# Patient Record
Sex: MALE | Race: ASIAN | NOT HISPANIC OR LATINO | URBAN - METROPOLITAN AREA
[De-identification: names, ages, dates, MRNs, and addresses within clinical notes are randomized per-mention and may not be internally consistent; named-entity substitution may affect disease eponyms.]

---

## 2023-01-01 ENCOUNTER — HOSPITAL ENCOUNTER (INPATIENT)
Facility: HOSPITAL | Age: 78
LOS: 1 days | End: 2023-05-28
Attending: SURGERY | Admitting: SURGERY

## 2023-01-01 ENCOUNTER — APPOINTMENT (INPATIENT)
Dept: RADIOLOGY | Facility: HOSPITAL | Age: 78
End: 2023-01-01

## 2023-01-01 ENCOUNTER — APPOINTMENT (EMERGENCY)
Dept: RADIOLOGY | Facility: HOSPITAL | Age: 78
End: 2023-01-01

## 2023-01-01 VITALS
HEIGHT: 62 IN | SYSTOLIC BLOOD PRESSURE: 100 MMHG | HEART RATE: 28 BPM | OXYGEN SATURATION: 100 % | RESPIRATION RATE: 16 BRPM | BODY MASS INDEX: 20.65 KG/M2 | DIASTOLIC BLOOD PRESSURE: 65 MMHG | TEMPERATURE: 97.5 F | WEIGHT: 112.21 LBS

## 2023-01-01 DIAGNOSIS — I60.9 SAH (SUBARACHNOID HEMORRHAGE) (HCC): ICD-10-CM

## 2023-01-01 DIAGNOSIS — T14.90XA TRAUMA: Primary | ICD-10-CM

## 2023-01-01 DIAGNOSIS — S06.5XAA SDH (SUBDURAL HEMATOMA) (HCC): ICD-10-CM

## 2023-01-01 LAB
2HR DELTA HS TROPONIN: 1 NG/L
4HR DELTA HS TROPONIN: 5 NG/L
ABO GROUP BLD: NORMAL
ABO GROUP BLD: NORMAL
ALBUMIN SERPL BCP-MCNC: 3.9 G/DL (ref 3.5–5)
ALP SERPL-CCNC: 54 U/L (ref 46–116)
ALT SERPL W P-5'-P-CCNC: 42 U/L (ref 12–78)
AMPHETAMINES SERPL QL SCN: NEGATIVE
ANION GAP SERPL CALCULATED.3IONS-SCNC: 2 MMOL/L (ref 4–13)
ANION GAP SERPL CALCULATED.3IONS-SCNC: 4 MMOL/L (ref 4–13)
APAP SERPL-MCNC: <2 UG/ML (ref 10–20)
APTT PPP: 89 SECONDS (ref 23–37)
AST SERPL W P-5'-P-CCNC: 60 U/L (ref 5–45)
ATRIAL RATE: 73 BPM
ATRIAL RATE: 88 BPM
BARBITURATES UR QL: NEGATIVE
BASE EXCESS BLDA CALC-SCNC: -1.8 MMOL/L
BASE EXCESS BLDA CALC-SCNC: 5 MMOL/L (ref -2–3)
BASOPHILS # BLD AUTO: 0.02 THOUSANDS/ÂΜL (ref 0–0.1)
BASOPHILS NFR BLD AUTO: 0 % (ref 0–1)
BENZODIAZ UR QL: NEGATIVE
BILIRUB SERPL-MCNC: 1.33 MG/DL (ref 0.2–1)
BLD GP AB SCN SERPL QL: NEGATIVE
BUN SERPL-MCNC: 16 MG/DL (ref 5–25)
BUN SERPL-MCNC: 16 MG/DL (ref 5–25)
CA-I BLD-SCNC: 1.23 MMOL/L (ref 1.12–1.32)
CALCIUM SERPL-MCNC: 7.8 MG/DL (ref 8.3–10.1)
CALCIUM SERPL-MCNC: 9.3 MG/DL (ref 8.3–10.1)
CARDIAC TROPONIN I PNL SERPL HS: 12 NG/L
CARDIAC TROPONIN I PNL SERPL HS: 7 NG/L
CARDIAC TROPONIN I PNL SERPL HS: 8 NG/L
CHLORIDE SERPL-SCNC: 105 MMOL/L (ref 96–108)
CHLORIDE SERPL-SCNC: 115 MMOL/L (ref 96–108)
CKLY30: >22 % (ref 0–2.6)
CKR(REACTION TIME): 11.6 MIN (ref 4.6–9.1)
CO2 SERPL-SCNC: 27 MMOL/L (ref 21–32)
CO2 SERPL-SCNC: 28 MMOL/L (ref 21–32)
COCAINE UR QL: NEGATIVE
CREAT SERPL-MCNC: 1.15 MG/DL (ref 0.6–1.3)
CREAT SERPL-MCNC: 1.25 MG/DL (ref 0.6–1.3)
CRTMA(RAPIDTEG MAX AMPLITUDE): <40 MM (ref 52–70)
EOSINOPHIL # BLD AUTO: 0.06 THOUSAND/ÂΜL (ref 0–0.61)
EOSINOPHIL NFR BLD AUTO: 1 % (ref 0–6)
ERYTHROCYTE [DISTWIDTH] IN BLOOD BY AUTOMATED COUNT: 12 % (ref 11.6–15.1)
ETHANOL SERPL-MCNC: <3 MG/DL (ref 0–3)
ETHANOL SERPL-MCNC: <3 MG/DL (ref 0–3)
GFR SERPL CREATININE-BSD FRML MDRD: 55 ML/MIN/1.73SQ M
GFR SERPL CREATININE-BSD FRML MDRD: 61 ML/MIN/1.73SQ M
GLUCOSE SERPL-MCNC: 153 MG/DL (ref 65–140)
GLUCOSE SERPL-MCNC: 157 MG/DL (ref 65–140)
GLUCOSE SERPL-MCNC: 166 MG/DL (ref 65–140)
HCO3 BLDA-SCNC: 22.8 MMOL/L (ref 22–28)
HCO3 BLDA-SCNC: 32.7 MMOL/L (ref 24–30)
HCT VFR BLD AUTO: 40.8 % (ref 36.5–49.3)
HCT VFR BLD CALC: 40 % (ref 36.5–49.3)
HGB BLD-MCNC: 13 G/DL (ref 12–17)
HGB BLDA-MCNC: 13.6 G/DL (ref 12–17)
HKHMA(MAX AMPLITUDE KAOLIN): <42 MM (ref 53–68)
IMM GRANULOCYTES # BLD AUTO: 0.01 THOUSAND/UL (ref 0–0.2)
IMM GRANULOCYTES NFR BLD AUTO: 0 % (ref 0–2)
INR PPP: 2.17 (ref 0.84–1.19)
LYMPHOCYTES # BLD AUTO: 4.08 THOUSANDS/ÂΜL (ref 0.6–4.47)
LYMPHOCYTES NFR BLD AUTO: 50 % (ref 14–44)
MAGNESIUM SERPL-MCNC: 2.1 MG/DL (ref 1.6–2.6)
MCH RBC QN AUTO: 31.9 PG (ref 26.8–34.3)
MCHC RBC AUTO-ENTMCNC: 31.9 G/DL (ref 31.4–37.4)
MCV RBC AUTO: 100 FL (ref 82–98)
METHADONE UR QL: NEGATIVE
MONOCYTES # BLD AUTO: 0.51 THOUSAND/ÂΜL (ref 0.17–1.22)
MONOCYTES NFR BLD AUTO: 6 % (ref 4–12)
NEUTROPHILS # BLD AUTO: 3.52 THOUSANDS/ÂΜL (ref 1.85–7.62)
NEUTS SEG NFR BLD AUTO: 43 % (ref 43–75)
NRBC BLD AUTO-RTO: 0 /100 WBCS
O2 CT BLDA-SCNC: 17.2 ML/DL (ref 16–23)
OPIATES UR QL SCN: NEGATIVE
OSMOLALITY UR/SERPL-RTO: 315 MMOL/KG (ref 282–298)
OXYCODONE+OXYMORPHONE UR QL SCN: NEGATIVE
OXYHGB MFR BLDA: 98.8 % (ref 94–97)
P AXIS: 80 DEGREES
PCO2 BLD: 35 MMOL/L (ref 21–32)
PCO2 BLD: 63.7 MM HG (ref 42–50)
PCO2 BLDA: 38.5 MM HG (ref 36–44)
PCP UR QL: NEGATIVE
PH BLD: 7.32 [PH] (ref 7.3–7.4)
PH BLDA: 7.39 [PH] (ref 7.35–7.45)
PHOSPHATE SERPL-MCNC: 3.7 MG/DL (ref 2.3–4.1)
PLATELET # BLD AUTO: 145 THOUSANDS/UL (ref 149–390)
PMV BLD AUTO: 9.1 FL (ref 8.9–12.7)
PO2 BLD: 24 MM HG (ref 35–45)
PO2 BLDA: 442.7 MM HG (ref 75–129)
POTASSIUM BLD-SCNC: 3.3 MMOL/L (ref 3.5–5.3)
POTASSIUM SERPL-SCNC: 3.2 MMOL/L (ref 3.5–5.3)
POTASSIUM SERPL-SCNC: 4.4 MMOL/L (ref 3.5–5.3)
PR INTERVAL: 0 MS
PR INTERVAL: 176 MS
PROT SERPL-MCNC: 7 G/DL (ref 6.4–8.4)
PROTHROMBIN TIME: 24.4 SECONDS (ref 11.6–14.5)
QRS AXIS: -3 DEGREES
QRS AXIS: 48 DEGREES
QRSD INTERVAL: 125 MS
QRSD INTERVAL: 138 MS
QT INTERVAL: 383 MS
QT INTERVAL: 414 MS
QTC INTERVAL: 445 MS
QTC INTERVAL: 500 MS
RBC # BLD AUTO: 4.07 MILLION/UL (ref 3.88–5.62)
RH BLD: POSITIVE
RH BLD: POSITIVE
SALICYLATES SERPL-MCNC: <3 MG/DL (ref 3–20)
SAO2 % BLD FROM PO2: 37 % (ref 60–85)
SODIUM BLD-SCNC: 141 MMOL/L (ref 136–145)
SODIUM SERPL-SCNC: 137 MMOL/L (ref 135–147)
SODIUM SERPL-SCNC: 144 MMOL/L (ref 135–147)
SPECIMEN EXPIRATION DATE: NORMAL
SPECIMEN SOURCE: ABNORMAL
SPECIMEN SOURCE: ABNORMAL
T WAVE AXIS: 56 DEGREES
T WAVE AXIS: 56 DEGREES
THC UR QL: NEGATIVE
VENTRICULAR RATE: 81 BPM
VENTRICULAR RATE: 88 BPM
WBC # BLD AUTO: 8.2 THOUSAND/UL (ref 4.31–10.16)

## 2023-01-01 PROCEDURE — 0BH18EZ INSERTION OF ENDOTRACHEAL AIRWAY INTO TRACHEA, VIA NATURAL OR ARTIFICIAL OPENING ENDOSCOPIC: ICD-10-PCS

## 2023-01-01 PROCEDURE — 5A12012 PERFORMANCE OF CARDIAC OUTPUT, SINGLE, MANUAL: ICD-10-PCS | Performed by: EMERGENCY MEDICINE

## 2023-01-01 PROCEDURE — 5A1935Z RESPIRATORY VENTILATION, LESS THAN 24 CONSECUTIVE HOURS: ICD-10-PCS

## 2023-01-01 RX ORDER — ETOMIDATE 2 MG/ML
INJECTION INTRAVENOUS CODE/TRAUMA/SEDATION MEDICATION
Status: COMPLETED | OUTPATIENT
Start: 2023-01-01 | End: 2023-01-01

## 2023-01-01 RX ORDER — FENTANYL CITRATE-0.9 % NACL/PF 10 MCG/ML
50 PLASTIC BAG, INJECTION (ML) INTRAVENOUS CONTINUOUS
Status: DISCONTINUED | OUTPATIENT
Start: 2023-01-01 | End: 2023-01-01

## 2023-01-01 RX ORDER — 3% SODIUM CHLORIDE 3 G/100ML
30 INJECTION, SOLUTION INTRAVENOUS CONTINUOUS
Status: DISCONTINUED | OUTPATIENT
Start: 2023-01-01 | End: 2023-01-01

## 2023-01-01 RX ORDER — CHLORHEXIDINE GLUCONATE 0.12 MG/ML
15 RINSE ORAL EVERY 12 HOURS SCHEDULED
Status: DISCONTINUED | OUTPATIENT
Start: 2023-01-01 | End: 2023-01-01

## 2023-01-01 RX ORDER — OXYMETAZOLINE HYDROCHLORIDE 0.05 G/100ML
2 SPRAY NASAL EVERY 12 HOURS SCHEDULED
Status: DISCONTINUED | OUTPATIENT
Start: 2023-01-01 | End: 2023-01-01 | Stop reason: HOSPADM

## 2023-01-01 RX ORDER — ONDANSETRON 2 MG/ML
4 INJECTION INTRAMUSCULAR; INTRAVENOUS ONCE
Status: COMPLETED | OUTPATIENT
Start: 2023-01-01 | End: 2023-01-01

## 2023-01-01 RX ORDER — LABETALOL HYDROCHLORIDE 5 MG/ML
10 INJECTION, SOLUTION INTRAVENOUS EVERY 4 HOURS PRN
Status: DISCONTINUED | OUTPATIENT
Start: 2023-01-01 | End: 2023-01-01 | Stop reason: HOSPADM

## 2023-01-01 RX ORDER — HYDROMORPHONE HCL IN WATER/PF 6 MG/30 ML
0.2 PATIENT CONTROLLED ANALGESIA SYRINGE INTRAVENOUS EVERY 4 HOURS PRN
Status: DISCONTINUED | OUTPATIENT
Start: 2023-01-01 | End: 2023-01-01

## 2023-01-01 RX ORDER — SUCCINYLCHOLINE/SOD CL,ISO/PF 100 MG/5ML
SYRINGE (ML) INTRAVENOUS CODE/TRAUMA/SEDATION MEDICATION
Status: COMPLETED | OUTPATIENT
Start: 2023-01-01 | End: 2023-01-01

## 2023-01-01 RX ORDER — PROPOFOL 10 MG/ML
INJECTION, EMULSION INTRAVENOUS CODE/TRAUMA/SEDATION MEDICATION
Status: COMPLETED | OUTPATIENT
Start: 2023-01-01 | End: 2023-01-01

## 2023-01-01 RX ORDER — CHLORHEXIDINE GLUCONATE 0.12 MG/ML
15 RINSE ORAL EVERY 12 HOURS SCHEDULED
Status: DISCONTINUED | OUTPATIENT
Start: 2023-01-01 | End: 2023-01-01 | Stop reason: HOSPADM

## 2023-01-01 RX ORDER — SODIUM CHLORIDE 3 G/100ML
250 INJECTION, SOLUTION INTRAVENOUS ONCE
Status: COMPLETED | OUTPATIENT
Start: 2023-01-01 | End: 2023-01-01

## 2023-01-01 RX ORDER — HYDROMORPHONE HCL/PF 1 MG/ML
0.5 SYRINGE (ML) INJECTION ONCE
Status: COMPLETED | OUTPATIENT
Start: 2023-01-01 | End: 2023-01-01

## 2023-01-01 RX ORDER — SODIUM CHLORIDE 9 MG/ML
100 INJECTION, SOLUTION INTRAVENOUS CONTINUOUS
Status: DISCONTINUED | OUTPATIENT
Start: 2023-01-01 | End: 2023-01-01 | Stop reason: HOSPADM

## 2023-01-01 RX ORDER — PROPOFOL 10 MG/ML
5-50 INJECTION, EMULSION INTRAVENOUS
Status: DISCONTINUED | OUTPATIENT
Start: 2023-01-01 | End: 2023-01-01

## 2023-01-01 RX ORDER — LABETALOL HYDROCHLORIDE 5 MG/ML
20 INJECTION, SOLUTION INTRAVENOUS ONCE
Status: COMPLETED | OUTPATIENT
Start: 2023-01-01 | End: 2023-01-01

## 2023-01-01 RX ORDER — HYDROMORPHONE HCL/PF 1 MG/ML
0.5 SYRINGE (ML) INJECTION EVERY 4 HOURS PRN
Status: DISCONTINUED | OUTPATIENT
Start: 2023-01-01 | End: 2023-01-01

## 2023-01-01 RX ORDER — FENTANYL CITRATE 50 UG/ML
50 INJECTION, SOLUTION INTRAMUSCULAR; INTRAVENOUS ONCE
Status: COMPLETED | OUTPATIENT
Start: 2023-01-01 | End: 2023-01-01

## 2023-01-01 RX ORDER — FENTANYL CITRATE 50 UG/ML
INJECTION, SOLUTION INTRAMUSCULAR; INTRAVENOUS
Status: COMPLETED
Start: 2023-01-01 | End: 2023-01-01

## 2023-01-01 RX ORDER — ONDANSETRON 2 MG/ML
4 INJECTION INTRAMUSCULAR; INTRAVENOUS EVERY 6 HOURS PRN
Status: DISCONTINUED | OUTPATIENT
Start: 2023-01-01 | End: 2023-01-01 | Stop reason: HOSPADM

## 2023-01-01 RX ADMIN — TETANUS TOXOID, REDUCED DIPHTHERIA TOXOID AND ACELLULAR PERTUSSIS VACCINE, ADSORBED 0.5 ML: 5; 2.5; 8; 8; 2.5 SUSPENSION INTRAMUSCULAR at 01:50

## 2023-01-01 RX ADMIN — SODIUM CHLORIDE 100 ML/HR: 0.9 INJECTION, SOLUTION INTRAVENOUS at 04:41

## 2023-01-01 RX ADMIN — LABETALOL HYDROCHLORIDE 20 MG: 5 INJECTION, SOLUTION INTRAVENOUS at 01:47

## 2023-01-01 RX ADMIN — CHLORHEXIDINE GLUCONATE 0.12% ORAL RINSE 15 ML: 1.2 LIQUID ORAL at 08:05

## 2023-01-01 RX ADMIN — SODIUM CHLORIDE 100 ML/HR: 0.9 INJECTION, SOLUTION INTRAVENOUS at 01:52

## 2023-01-01 RX ADMIN — Medication 2000 UNITS: at 03:07

## 2023-01-01 RX ADMIN — PHYTONADIONE 10 MG: 10 INJECTION, EMULSION INTRAMUSCULAR; INTRAVENOUS; SUBCUTANEOUS at 03:27

## 2023-01-01 RX ADMIN — Medication 80 MG: at 02:15

## 2023-01-01 RX ADMIN — IOHEXOL 85 ML: 350 INJECTION, SOLUTION INTRAVENOUS at 02:56

## 2023-01-01 RX ADMIN — PROPOFOL 30 MCG/KG/MIN: 10 INJECTION, EMULSION INTRAVENOUS at 03:19

## 2023-01-01 RX ADMIN — LEVETIRACETAM 1500 MG: 100 INJECTION, SOLUTION INTRAVENOUS at 04:11

## 2023-01-01 RX ADMIN — SODIUM CHLORIDE 250 ML: 3 INJECTION, SOLUTION INTRAVENOUS at 03:20

## 2023-01-01 RX ADMIN — ETOMIDATE 10 MG: 20 INJECTION, SOLUTION INTRAVENOUS at 02:15

## 2023-01-01 RX ADMIN — FENTANYL CITRATE 50 MCG: 50 INJECTION INTRAMUSCULAR; INTRAVENOUS at 10:15

## 2023-01-01 RX ADMIN — FENTANYL CITRATE 50 MCG: 50 INJECTION, SOLUTION INTRAMUSCULAR; INTRAVENOUS at 10:15

## 2023-01-01 RX ADMIN — DESMOPRESSIN ACETATE 23.2 MCG: 4 SOLUTION INTRAVENOUS at 03:22

## 2023-01-01 RX ADMIN — ONDANSETRON 4 MG: 2 INJECTION INTRAMUSCULAR; INTRAVENOUS at 01:15

## 2023-01-01 RX ADMIN — Medication 50 MCG/HR: at 03:16

## 2023-01-01 RX ADMIN — HYDROMORPHONE HYDROCHLORIDE 0.5 MG: 1 INJECTION, SOLUTION INTRAMUSCULAR; INTRAVENOUS; SUBCUTANEOUS at 03:15

## 2023-01-01 RX ADMIN — HYDROMORPHONE HYDROCHLORIDE 0.5 MG: 1 INJECTION, SOLUTION INTRAMUSCULAR; INTRAVENOUS; SUBCUTANEOUS at 02:33

## 2023-01-01 RX ADMIN — PROPOFOL 20 MG: 10 INJECTION, EMULSION INTRAVENOUS at 02:20

## 2023-01-01 RX ADMIN — CHLORHEXIDINE GLUCONATE 0.12% ORAL RINSE 15 ML: 1.2 LIQUID ORAL at 03:20

## 2023-01-01 RX ADMIN — SODIUM CHLORIDE 50 ML/HR: 3 INJECTION, SOLUTION INTRAVENOUS at 03:52

## 2023-05-28 PROBLEM — I62.00 SUBDURAL HEMORRHAGE (HCC): Status: ACTIVE | Noted: 2023-01-01
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
## 2023-05-28 PROBLEM — J96.00 ACUTE RESPIRATORY FAILURE (HCC): Status: ACTIVE | Noted: 2023-01-01

## 2023-05-28 NOTE — PLAN OF CARE
Problem: MOBILITY - ADULT  Goal: Maintain or return to baseline ADL function  Description: INTERVENTIONS:  -  Assess patient's ability to carry out ADLs; assess patient's baseline for ADL function and identify physical deficits which impact ability to perform ADLs (bathing, care of mouth/teeth, toileting, grooming, dressing, etc )  - Assess/evaluate cause of self-care deficits   - Assess range of motion  - Assess patient's mobility; develop plan if impaired  - Assess patient's need for assistive devices and provide as appropriate  - Encourage maximum independence but intervene and supervise when necessary  - Involve family in performance of ADLs  - Assess for home care needs following discharge   - Consider OT consult to assist with ADL evaluation and planning for discharge  - Provide patient education as appropriate  Outcome: Not Progressing  Goal: Maintains/Returns to pre admission functional level  Description: INTERVENTIONS:  - Perform BMAT or MOVE assessment daily    - Set and communicate daily mobility goal to care team and patient/family/caregiver  - Collaborate with rehabilitation services on mobility goals if consulted  - Perform Range of Motion 3 times a day  - Reposition patient every 2 hours    - Dangle patient 3 times a day  - Stand patient 3 times a day  - Ambulate patient 3 times a day  - Out of bed to chair 3 times a day   - Out of bed for meals 3 times a day  - Out of bed for toileting  - Record patient progress and toleration of activity level   Outcome: Not Progressing     Problem: Prexisting or High Potential for Compromised Skin Integrity  Goal: Skin integrity is maintained or improved  Description: INTERVENTIONS:  - Identify patients at risk for skin breakdown  - Assess and monitor skin integrity  - Assess and monitor nutrition and hydration status  - Monitor labs   - Assess for incontinence   - Turn and reposition patient  - Assist with mobility/ambulation  - Relieve pressure over bony prominences  - Avoid friction and shearing  - Provide appropriate hygiene as needed including keeping skin clean and dry  - Evaluate need for skin moisturizer/barrier cream  - Collaborate with interdisciplinary team   - Patient/family teaching  - Consider wound care consult   Outcome: Not Progressing     Problem: PAIN - ADULT  Goal: Verbalizes/displays adequate comfort level or baseline comfort level  Description: Interventions:  - Encourage patient to monitor pain and request assistance  - Assess pain using appropriate pain scale  - Administer analgesics based on type and severity of pain and evaluate response  - Implement non-pharmacological measures as appropriate and evaluate response  - Consider cultural and social influences on pain and pain management  - Notify physician/advanced practitioner if interventions unsuccessful or patient reports new pain  Outcome: Not Progressing     Problem: INFECTION - ADULT  Goal: Absence or prevention of progression during hospitalization  Description: INTERVENTIONS:  - Assess and monitor for signs and symptoms of infection  - Monitor lab/diagnostic results  - Monitor all insertion sites, i e  indwelling lines, tubes, and drains  - Monitor endotracheal if appropriate and nasal secretions for changes in amount and color  - [place] appropriate cooling/warming therapies per order  - Administer medications as ordered  - Instruct and encourage patient and family to use good hand hygiene technique  - Identify and instruct in appropriate isolation precautions for identified infection/condition  Outcome: Not Progressing     Problem: SAFETY ADULT  Goal: Maintain or return to baseline ADL function  Description: INTERVENTIONS:  -  Assess patient's ability to carry out ADLs; assess patient's baseline for ADL function and identify physical deficits which impact ability to perform ADLs (bathing, care of mouth/teeth, toileting, grooming, dressing, etc )  - Assess/evaluate cause of self-care deficits   - Assess range of motion  - Assess patient's mobility; develop plan if impaired  - Assess patient's need for assistive devices and provide as appropriate  - Encourage maximum independence but intervene and supervise when necessary  - Involve family in performance of ADLs  - Assess for home care needs following discharge   - Consider OT consult to assist with ADL evaluation and planning for discharge  - Provide patient education as appropriate  Outcome: Not Progressing  Goal: Maintains/Returns to pre admission functional level  Description: INTERVENTIONS:  - Perform BMAT or MOVE assessment daily    - Set and communicate daily mobility goal to care team and patient/family/caregiver  - Collaborate with rehabilitation services on mobility goals if consulted  - Perform Range of Motion 3 times a day  - Reposition patient every 2 hours    - Dangle patient 3 times a day  - Stand patient 3 times a day  - Ambulate patient 3 times a day  - Out of bed to chair 3 times a day   - Out of bed for meals 3 times a day  - Out of bed for toileting  - Record patient progress and toleration of activity level   Outcome: Not Progressing  Goal: Patient will remain free of falls  Description: INTERVENTIONS:  - Educate patient/family on patient safety including physical limitations  - Instruct patient to call for assistance with activity   - Consult OT/PT to assist with strengthening/mobility   - Keep Call bell within reach  - Keep bed low and locked with side rails adjusted as appropriate  - Keep care items and personal belongings within reach  - Initiate and maintain comfort rounds  - Make Fall Risk Sign visible to staff  - Offer Toileting every 2 Hours, in advance of need  - Initiate/Maintain bed alarm  - Obtain necessary fall risk management equipment: n/a  - Apply yellow socks and bracelet for high fall risk patients  - Consider moving patient to room near nurses station  Outcome: Not Progressing     Problem: DISCHARGE PLANNING  Goal: Discharge to home or other facility with appropriate resources  Description: INTERVENTIONS:  - Identify barriers to discharge w/patient and caregiver  - Arrange for needed discharge resources and transportation as appropriate  - Identify discharge learning needs (meds, wound care, etc )  - Arrange for interpretive services to assist at discharge as needed  - Refer to Case Management Department for coordinating discharge planning if the patient needs post-hospital services based on physician/advanced practitioner order or complex needs related to functional status, cognitive ability, or social support system  Outcome: Not Progressing     Problem: Knowledge Deficit  Goal: Patient/family/caregiver demonstrates understanding of disease process, treatment plan, medications, and discharge instructions  Description: Complete learning assessment and assess knowledge base  Interventions:  - Provide teaching at level of understanding  - Provide teaching via preferred learning methods  Outcome: Not Progressing     Problem: Neurological Deficit  Goal: Neurological status is stable or improving  Description: Interventions:  - Monitor and assess patient's level of consciousness, motor function, sensory function, and level of assistance needed for ADLs  - Monitor and report changes from baseline  Collaborate with interdisciplinary team to initiate plan and implement interventions as ordered  - Provide and maintain a safe environment  - Consider seizure precautions  - Consider fall precautions  - Consider aspiration precautions  - Consider bleeding precautions  Outcome: Not Progressing     Problem: Activity Intolerance/Impaired Mobility  Goal: Mobility/activity is maintained at optimum level for patient  Description: Interventions:  - Assess and monitor patient  barriers to mobility and need for assistive/adaptive devices  - Assess patient's emotional response to limitations    - Collaborate with interdisciplinary team and initiate plans and interventions as ordered  - Encourage independent activity per ability   - Maintain proper body alignment  - Perform active/passive rom as tolerated/ordered  - Plan activities to conserve energy   - Turn patient as appropriate  Outcome: Not Progressing     Problem: Communication Impairment  Goal: Ability to express needs and understand communication  Description: Assess patient's communication skills and ability to understand information  Patient will demonstrate use of effective communication techniques, alternative methods of communication and understanding even if not able to speak  - Encourage communication and provide alternate methods of communication as needed  - Collaborate with case management/ for discharge needs  - Include patient/family/caregiver in decisions related to communication  Outcome: Not Progressing     Problem: Potential for Aspiration  Goal: Ventilated patient's risk of aspiration is minimized  Description: Assess and monitor vital signs, respiratory status, airway cuff pressure, and labs (WBC)  Monitor for signs of aspiration (tachypnea, cough, rales, wheezing, cyanosis, fever)  - Elevate head of bed 30 degrees if patient has tube feeding   - Monitor tube feeding  Outcome: Not Progressing     Problem: Nutrition  Goal: Nutrition/Hydration status is improving  Description: Monitor and assess patient's nutrition/hydration status for malnutrition (ex- brittle hair, bruises, dry skin, pale skin and conjunctiva, muscle wasting, smooth red tongue, and disorientation)  Collaborate with interdisciplinary team and initiate plan and interventions as ordered  Monitor patient's weight and dietary intake as ordered or per policy  Utilize nutrition screening tool and intervene per policy  Determine patient's food preferences and provide high-protein, high-caloric foods as appropriate       - Assist patient with eating   - Allow adequate time for meals   - Encourage patient to take dietary supplement as ordered  - Collaborate with clinical nutritionist   - Include patient/family/caregiver in decisions related to nutrition  Outcome: Not Progressing     Problem: NEUROSENSORY - ADULT  Goal: Achieves stable or improved neurological status  Description: INTERVENTIONS  - Monitor and report changes in neurological status  - Monitor vital signs such as temperature, blood pressure, glucose, and any other labs ordered   - Initiate measures to prevent increased intracranial pressure  - Monitor for seizure activity and implement precautions if appropriate      Outcome: Not Progressing  Goal: Remains free of injury related to seizures activity  Description: INTERVENTIONS  - Maintain airway, patient safety  and administer oxygen as ordered  - Monitor patient for seizure activity, document and report duration and description of seizure to physician/advanced practitioner  - If seizure occurs,  ensure patient safety during seizure  - Reorient patient post seizure  - Seizure pads on all 4 side rails  - Instruct patient/family to notify RN of any seizure activity including if an aura is experienced  - Instruct patient/family to call for assistance with activity based on nursing assessment  - Administer anti-seizure medications if ordered    Outcome: Not Progressing  Goal: Achieves maximal functionality and self care  Description: INTERVENTIONS  - Monitor swallowing and airway patency with patient fatigue and changes in neurological status  - Encourage and assist patient to increase activity and self care     - Encourage visually impaired, hearing impaired and aphasic patients to use assistive/communication devices  Outcome: Not Progressing

## 2023-05-28 NOTE — ED PROVIDER NOTES
Emergency Department Airway Evaluation and Management Form    History  Obtained from: EMS  Patient has no allergy information on record  No chief complaint on file  a76-year-old male status post [place] with [place] patient with GCS less than 13 per EMS  Language barrier present patient uncooperative  Level a trauma called prior to arrival          No past medical history on file  No past surgical history on file  No family history on file  I have reviewed and agree with the history as documented      Review of Systems    Physical Exam  BP (!) 161/119   Pulse 90   Resp 20   SpO2 95%     Physical Exam  HENT:      Mouth/Throat:      Comments: Airway open pain        ED Medications  Medications   ondansetron (ZOFRAN) injection 4 mg (4 mg Intravenous Given 5/28/23 0115)       Intubation  Procedures    Notes  No acute airway intervention indicated    Final Diagnosis  Final diagnoses:   None       ED Provider  Electronically Signed by     Mike Izquierdo MD  05/28/23 7673

## 2023-05-28 NOTE — CODE DOCUMENTATION
0957 pea, pulse check  Pt bagged by respiratory therapist  CPR resumed  1000 vtach with a pulse, synchronized cardioversion 200 joules  Bp 186/102  1001 hr 136, sinus tach with wide complex, intermittent pulse palpitated, bp 159/138  1002 hr 107, intermittent pulse,  1003 CPR resumes, 1 amp of epi  1005 pulse found hr 107 wide complex  Ines 2 100%, bp 143/110  1006 svt 158 with pulse  1007 hr 137 with pulse /77  1010 cardiac echo at bedside by Dr Vel Lorenzo  1011 hr 89 bp 100/65  1011 placed back on vent setting  1018 Pt pronounced by Dr Vel Lorenzo   Pt  Asystole on monitor

## 2023-05-28 NOTE — PLAN OF CARE
Problem: PAIN - ADULT  Goal: Verbalizes/displays adequate comfort level or baseline comfort level  Description: Interventions:  - Encourage patient to monitor pain and request assistance  - Assess pain using appropriate pain scale  - Administer analgesics based on type and severity of pain and evaluate response  - Implement non-pharmacological measures as appropriate and evaluate response  - Consider cultural and social influences on pain and pain management  - Notify physician/advanced practitioner if interventions unsuccessful or patient reports new pain  Outcome: Progressing     Problem: INFECTION - ADULT  Goal: Absence or prevention of progression during hospitalization  Description: INTERVENTIONS:  - Assess and monitor for signs and symptoms of infection  - Monitor lab/diagnostic results  - Monitor all insertion sites, i e  indwelling lines, tubes, and drains  - Monitor endotracheal if appropriate and nasal secretions for changes in amount and color  - [place] appropriate cooling/warming therapies per order  - Administer medications as ordered  - Instruct and encourage patient and family to use good hand hygiene technique  - Identify and instruct in appropriate isolation precautions for identified infection/condition  Outcome: Progressing
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
## 2023-05-28 NOTE — RESPIRATORY THERAPY NOTE
RT Ventilator Management Note  Ngoc Gardiner 78 y o  male MRN: [id number]  Unit/Bed#: ICU 12 Encounter: [id number]      Daily Screen    No data found in the last 10 encounters  Physical Exam:   Assessment Type: Assess only  General Appearance: Sedated  Respiratory Pattern: Assisted  Chest Assessment: Chest expansion symmetrical  Bilateral Breath Sounds: Clear  O2 Device: Pt assess for respiratory protocol at this time with no history of pulmonary disease nor takes any respiratory treatment at home  Pt admitted for fall with subdural hemorrhage, acute respiratory failure  BS clear, VS WNL at this time  Will continue to monitor per respiratory procol  Resp Comments: (P) Pt assess for respiratory protocol at this time with no history of pulmonary disease nor takes any bromchodilators at home  Pt admitted for fall with subdural hemorrhage, acute respiratory failure  Will continue to monitor per respiratory protocol
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
## 2023-05-28 NOTE — DISCHARGE SUMMARY
Discharge Summary - Valery Colvin 78 y o  male MRN: [id number]    Unit/Bed#: ICU 12 Encounter: [id number] PCP: Tyler Villa    Admission Date:   Admission Orders (From admission, onward)     Ordered        05/28/23 0141  Inpatient Admission  Once                        Admitting Diagnosis: Trauma [T14 90XA]  SAH (subarachnoid hemorrhage) ([facility]) [I60 9]  SDH (subdural hematoma) ([facility] [address] ) [S06  5XAA]  Unspecified multiple injuries, initial encounter [T07  XXXA]    HPI: Patient is a 29-year-old male presenting as a level a trauma following a fall  He has an unknown past medical history  In the trauma bay it was unclear if the patient did not speak due to language barrier versus significant traumatic brain injury  The patient spontaneously open his eyes and was purposeful with movement but did not speak  His TEG analysis was grossly abnormal and received DDAVP and Kcentra  His initial neurologic imaging was notable for bilateral subdural hematomas and scattered subarachnoid hemorrhages  He was referred to the critical care unit for admission  Procedures Performed:   Orders Placed This Encounter   Procedures   • Intubation       Summary of Hospital Course: Shortly after admission the patient had significant decompensation both hemodynamically and neurologically  Repeat neurologic imaging demonstrated interval development increase in the bilateral subacute arachnoid hemorrhages and hemorrhagic contusions into the midbrain and brainstem  The patient was intubated and started on hypertonic saline and neurosurgery was contacted who felt that the recent CT demonstrated likely nonsurvivable traumatic brain injury  There were ongoing efforts to locate family or friends of the patient without effect  The patient continued to have hemodynamic changes including profound hypotension, loss of pupillary reflexes, loss of cough/gag raising the concern for brain death    Prior to an official brain death examination the patient's rhythm disintegrated into ventricular fibrillation at which point cardiopulmonary resuscitation was initiated  Despite 3 rounds of cardiopulmonary resuscitation he only had transient return to spontaneous circulation  Given his highly morbid intracranial imaging and profound hemodynamic instability the decision was made to terminate further cardiopulmonary resuscitative efforts after discussion with attendings for other services  The patient was pronounced   Significant Findings, Care, Treatment and Services Provided:    Cardiac arrest   CTA Head/Neck-interval development of significant increase in bilateral subarachnoid hemorrhage and hemorrhagic contusion including the midbrain and brainstem, bilateral subdural hematomas with right to left midline shift of 5 mm   Intubated   CT Head-bilateral subdural hematomas and scattered subarachnoid hemorrhage   CT Cervical spine-no cervical spine fracture or traumatic malalignment   Chest xray-increased density within the right lower lobe    Complications: None    Disposition:      Final Diagnosis:   1  Cardiac arrest  2  Intracranial hemorrhages  3  Suspected intracranial hypertension  4  Severe traumatic brain injury  5  Acute encephalopathy  6  Acute respiratory failure  7  Coagulopathy  8   Thrombocytopenia    Medical Problems        Condition at Time of Death: Resuscitative efforts ceased with no palpable pulse    Date, Time and Cause of Death    Date of Death: 23  Time of Death: 10:18 AM  Preliminary Cause of Death: Intracranial hemorrhage ([facility] [address] )  Entered by: Viridiana URBAN[PG1 1]     Attribution     PG1 1 [address] 23 10:38          Death Note:    INPATIENT DEATH NOTE  Vinod Fajardo 78 y o  male MRN: [id number]  Unit/Bed#: ICU 12 Encounter: [id number]    Date, Time and Cause of Death    Date of Death: 23  Time of Death: 10:18 AM  Preliminary Cause of Death: Intracranial hemorrhage ([facility] )  Entered by: Bertha URBAN[PG1 1]     Attribution     PG1 1 Bertha Cavazos, [address] 23 10:38           Patient's Information  Date of Death: 23  Time of Death: 80  Pronounced by: Dr Leda Garcia  Did the patient's death occur in the ED?: No  Did the patient's death occur in the OR?: No  Did the patient's death occur less than 10 days post-op?: No  Did the patient's death occur within 24 hours of admission?: Yes  Was code status DNR at the time of death?: Yes    PHYSICAL EXAM:  Unresponsive to noxious stimuli, Spontaneous respirations absent, Carotid pulse absent, Pupillary light reflex absent and Corneal blink reflex absent    Medical Examiner notification criteria:  Patient  within 24 hours of arrival to hospital   Medical Examiner's office notified?:  Yes   Medical Examiner accepted case?: Pending  Name of Medical Examiner: Pending    Family Notification  Was the family notified?: No (Comment) (Unable to locate family)    Autopsy Options:  Awaiting call back from medical examiner    Primary Service Attending Physician notified?:  yes - Attending:  Farhad Simmons, DO    Physician/Resident responsible for completing Discharge Summary:  RAJNI Joy

## 2023-05-28 NOTE — CONSULTS
Consultation - [address] 78 y o  male MRN: [id number]  Unit/Bed#: ICU 12 Encounter: [id number]      Assessment/Plan   Patient Active Problem List   Diagnosis   • Subdural hemorrhage ([address] )   • Subarachnoid hemorrhage ([address] )   • Acute respiratory failure (HCC)   • Abrasion of scalp   • Fall     Active issues specifically addressed today include:   - tSDH/tSAH with clinical concern for life threatening herniation   - acute hypoxemic respiratory failure   - cardiac arrest   - goals of support    Plan:  Patient intubated and Full Code given no family member contact with no contact information in the chart  Per chart review, multiple attempts to utilize patient's cell phone to identify contacts  No information available per EMS given injuries [place]  Initial CTH with bilateral tSDH with tSAH with no MLS [@ 8232]  Repeat CTA H/N with significant increase in bilateral tSDH and tSAH with new RTL midline shift [@ 7269]  p/w initially GCS 9 with rapid deterioration to GCS 3  Admitted to ICU for continued medical management      Exam this AM with fixed/dilated pupils c/w loss of brainstem reflexes  Consideration for brain death exam, however, hopes to identify family member or friend prior to exam  Attempts to identify contacts were unsuccessful, cell phone with no identified contacts to notify      This AM, during case discussion with Dr Ankita Farley in the ICU regarding patient's suspected non-survivable TBI with clinical and radiographic e/o life threatening brain herniation, concern for impending cardiac arrest on monitor  Team to bedside  Pulseless VF @ 6095  CPR initiated  Epi administered x 2  Synchronized cardioversion for wide complex pulseless rhythm  ROSC intermittently achieved  Bedside TTE with initial LV/RV function, rapidly deteriorated to poor functionality   Extensive chest wall damage occurred during high quality chest compressions       Given significance and non-survivable injury given TBI with herniation, Dr Jose Maki and myself concur that continued resuscitative supports are harmful and no longer provided beneficial care given extent of brain injury  Upon discussion with the entire team in the room, everyone, including Dr Jose Maki, myself, [facility] fellow, [facility] AP, nursing staff in agreement with plan to transition to Level 2      [facility] Attending at bedside with nursing staff  Given extent of chest wall injury, order for IV fentanyl 50 mcg to address pain/comfort [patient was tolerating fentanyl gtt of 50 mcg/hr overnight]  All in agreement that medication to address patient's comfort and would be tolerated to not hasten death  Prior to medication being administered, rhythm on the monitor actively deteriorating from wide complex tachycardia to bradycardia to asystolic rhythm      Patient pronounced at 1018  We appreciate the opportunity to participate in this patient's care  We will continue to follow  Please do not hesitate to contact our on-call provider through our clinic answering service at [phone] should you have acute symptom control concerns  Controlled Substance Review    [place]MP or [place]  reviewed: No red flags were identified; safe to proceed with prescription  [email or web address] PDMP Review     None          History of Present Illness   Physician Requesting Consult: No att  providers found  Reason for Consult / Principal Problem: support  Hx and PE limited by: critical illness, intubated  HPI: Janyth Kocher is a 78y o  year old male who p/w concern for TBI after fall  Initial w/u concerning for tSDH/tSAH with progression to suspected herniation  No family at bedside, team attempts to identify family/friends was unsuccessful  [facility] consulted for support  See above MDM section for expanded history and recommendations        Inpatient consult to Palliative Care  Consult performed by: Federico Hallman MD  Consult ordered by: Lisa Piper Fermin Garrido MD          Review of Systems   Unable to perform ROS: Acuity of condition       Historical Information   No past medical history on file  No past surgical history on file  No existing history information found  No existing history information found  Social History     Socioeconomic History   • Marital status: Not on file     Spouse name: Not on file   • Number of children: Not on file   • Years of education: Not on file   • Highest education level: Not on file   Occupational History   • Not on file   Tobacco Use   • Smoking status: Not on file   • Smokeless tobacco: Not on file   Substance and Sexual Activity   • Alcohol use: Not on file   • Drug use: Not on file   • Sexual activity: Not on file   Other Topics Concern   • Not on file   Social History Narrative   • Not on file     Social Determinants of Health     Financial Resource Strain: Not on file   Food Insecurity: Not on file   Transportation Needs: Not on file   Physical Activity: Not on file   Stress: Not on file   Social Connections: Not on file   Intimate Partner Violence: Not on file   Housing Stability: Not on file     No family history on file      Meds/Allergies   current meds:   Current Facility-Administered Medications   Medication Dose Route Frequency   • chlorhexidine (PERIDEX) 0 12 % oral rinse 15 mL  15 mL Mouth/Throat Q12H [address]   • labetalol (NORMODYNE) injection 10 mg  10 mg Intravenous Q4H PRN   • levETIRAcetam (KEPPRA) 1,000 mg in sodium chloride 0 9 % 100 mL IVPB  1,000 mg Intravenous Q12H [address]   • ondansetron (ZOFRAN) injection 4 mg  4 mg Intravenous Q6H PRN   • oxymetazoline (AFRIN) 0 05 % nasal spray 2 spray  2 spray Each Nare Q12H [address]   • sodium chloride 0 9 % infusion  100 mL/hr Intravenous Continuous         Not on File    Objective     Physical Exam  Constitutional:       Comments: Critically ill appearing  Intubated   HENT:      Right Ear: External ear normal       Left Ear: External ear normal    Eyes:      Comments: L eye fixed, dilated pupil  R periorbital edema   Pulmonary:      Comments: intubated  Genitourinary:     Comments: Hollis in place  Psychiatric:      Comments: Unable to assess         Lab Results:   I have personally reviewed pertinent labs  , CBC:   Lab Results   Component Value Date    HCT 40 8 05/28/2023    HCT 40 05/28/2023    HGB 13 0 05/28/2023    HGB 13 6 05/28/2023    MCH 31 9 05/28/2023    MCHC 31 9 05/28/2023     (H) 05/28/2023    MPV 9 1 05/28/2023    NRBC 0 05/28/2023     (L) 05/28/2023    RBC 4 07 05/28/2023    RDW 12 0 05/28/2023    WBC 8 20 05/28/2023   , CMP:   Lab Results   Component Value Date    ALKPHOS 54 05/28/2023    ALT 42 05/28/2023    AST 60 (H) 05/28/2023    BUN 16 05/28/2023    CALCIUM 7 8 (L) 05/28/2023     (H) 05/28/2023    CO2 27 05/28/2023    CO2 35 (H) 05/28/2023    CREATININE 1 15 05/28/2023    EGFR 61 05/28/2023    GLUCOSE 153 (H) 05/28/2023    K 4 4 05/28/2023    SODIUM 144 05/28/2023   , PT/PTT:  Lab Results   Component Value Date    PTT 89 (H) 05/28/2023     Imaging Studies: I have personally reviewed pertinent reports  EKG, Pathology, and Other Studies: I have personally reviewed pertinent reports  Code Status: Level 2 - DNAR: but accepts endotracheal intubation  Advance Directive and Living Will:   Not on File, not previously completed  Power of :   n/a  POLST:   n/a    Counseling / Coordination of Care  Total floor / unit time spent today 60 minutes  Greater than 50% of total time was spent with the patient and / or family counseling and / or coordination of care  A description of the counseling / coordination of care: provided medical updates, discussed palliative care, determined competency, determined goals of care, determined POA, determined social/family support, discussed plans of care, discussed symptom management, provided psychosocial support  See documentation above  PDMP Reviewed   Coordinated plan of care with primary team

## 2023-05-28 NOTE — PROGRESS NOTES
SCC Interval Progress Note:    Patients CT imaging and clinical exam reviewed on rounds this AM   Non-survivable multicompartmental brain injury  On exam this AM with fixed and dilated pupils, no occulomotor or occulovestibular reflexes, no obvious spontaneous respirations  Exam consistent with loss of brainstem reflexes  Extensive efforts made by team overnight and this morning to reach patient's family prior to formal brain death determination with no success  No family is available at this time, and we are actively making ongoing efforts to reach them  At appx 0955 patient developed pulseless vfib  CPR initiated  ROSC achieved and pt lost pulses again with brief ROSC  Bedside echo showed minimal LV/RV function    On discussion with myself, Dr Martina Luna of palliative medicine, nursing staff at bedside, palliative med fellow, and CC AP, in agreement that further CPR resuscitative measures would not provide benefit, and actually would cause additional harm, as patient's chest wall and ribs were broken extensively during resuscitation  Decision made to not perform further CPR in setting of terminal and irreversible brain injury  Several minutes later patient lost pulses and rapidly deteriorated to asystolic rhythm    Pronounced dead by me at 1018AM

## 2023-05-28 NOTE — CONSULTS
[address] [place] [address]  H&P: Critical Care  Name: Dena Jiménez 78 y o  male I MRN: [id number]  Unit/Bed#: ED 16 I Date of Admission: 5/28/2023   Date of Service: 5/28/2023 I Hospital Day: 0      Assessment/Plan   Neuro:   · Diagnosis: Bilateral subdural hematomas, Bilateral subarachnoid hemorrhages   · Plan: Neurosurgery consulted, will follow up recs  Avoid hyponatremia  Trend sodium  Q1h neuro checks  Repeat CT head 6 hours  Keppra for seizure prophylaxis  Goal SBP<180  GCS currently 9   · Diagnosis: Nausea  · Plan: In setting of [address]  PRN zofran      CV:   · Diagnosis: Hypertension  · Plan: Likely 2/2 SDH/SAH  Unlcear home medications  Maintain SBP<180  Continuous cardiopulmonary monitoring  PRN labetalol  Will add cardene gtt if needed  Pulm:  · Diagnosis: Intubated 2/2 mental status  · Plan: Continue mechanical ventilation, AC/VC  Wean FiO2 for goal SpO2>92  GI:   No active issues    :   · Diagnosis: Hollis  · Plan: Hollis catheter, Monitor I/O  Trend Cr    F/E/N:    · Fluids: NS @100 cc/hr  · Electrolytes: Replete for K>4, PO4>3, Mg>2  Avoid hyponatremia  · Nutrition: NPO      Heme/Onc:   No active issues    Endo:   No active issues  q6h glucose checks  Hypoglycemia protocol  ID:   No active issues    MSK/Skin:   · Diagnosis: Pressure injury prevention  · Plan: Reposition frequently  Pressure offloading  Disposition: Critical care       History of Present Illness     HPI: Dena Jiménez is a 78 y o  male who presents as a level A trauma activation after fall with head strike with decreased mental status afterwards  Limited history as patient not verbally responsive on arrival and no documentation in EMR  Patient underwent CT head which showed bilateral SDH/SAH  His mental status deteriorated rapidly and therefore the patient was intubated  Unknown AC/AP use, however, given derangements on TEG, likely on AC and AP  Given kcentra and DDAVP   Patient underwent repeat CTA with blossoming of SAH  Neurosurgery consulted  Patient brought to ICU for further care  History obtained from chart review and unobtainable from patient due to mental status  Review of Systems   Unable to perform ROS: Mental status change      Historical Information   No past medical history on file  No past surgical history on file  No current outpatient medications Not on File     No family history on file  Objective                            Vitals I/O      Most Recent Min/Max in 24hrs   Temp   No data recorded   Pulse 87 Pulse  Min: 87  Max: 90   Resp 20 Resp  Min: 20  Max: 20   BP (!) 183/110 BP  Min: 140/103  Max: 183/110   O2 Sat 95 % SpO2  Min: 95 %  Max: 97 %    No intake or output data in the 24 hours ending 05/28/23 0153      Diet NPO     Invasive Monitoring Physical exam    Physical Exam  Constitutional:       Comments: Not alert   HENT:      Head: Normocephalic  Comments: Scalp abrasion     Right Ear: External ear normal       Left Ear: External ear normal       Nose: Nose normal       Mouth/Throat:      Pharynx: Oropharynx is clear  Eyes:      Extraocular Movements: Extraocular movements intact  Cardiovascular:      Rate and Rhythm: Normal rate  Pulses: Normal pulses  Pulmonary:      Effort: Pulmonary effort is normal  No respiratory distress  Abdominal:      General: There is no distension  Palpations: Abdomen is soft  Tenderness: There is no abdominal tenderness  Musculoskeletal:         General: No deformity  Cervical back: Neck supple  No rigidity  Skin:     General: Skin is warm  Neurological:      Comments: GCS3T            Diagnostic Studies      EKG: Reviewed, QTc 500  Imaging:  I have personally reviewed pertinent reports     and I have personally reviewed pertinent films in PACS     Medications:  Scheduled PRN   chlorhexidine, 15 mL, Q12H HAY  levETIRAcetam, 1,000 mg, Q12H HAY      labetalol, 10 mg, Q4H PRN  ondansetron, 4 mg, Q6H PRN Continuous    sodium chloride, 100 mL/hr, Last Rate: 100 mL/hr (05/28/23 0152)         Labs:    CBC    Recent Labs     05/28/23 0117   HCT 40 8  40   HGB 13 0  13 6   *   WBC 8 20     BMP    Recent Labs     05/28/23 0117   CO2 35*       Coags    No recent results     Additional Electrolytes  Recent Labs     05/28/23 0117   CAIONIZED 1 23          Blood Gas    No recent results  No recent results LFTs  No recent results    Infectious  No recent results  Glucose  No recent results              Justine Washington MD

## 2023-05-28 NOTE — QUICK NOTE
Upon my reassessment prior to taking the patient upstairs to ICU, he was less responsive than when he arrived to the trauma bay  He was no longer opening his eyes at all, including to pain  He continued to have no verbal response similarly to when he arrived  He was now flexing to pain in the upper and lower extremities compared to earlier when he was spontaneously moving his extremities  Given his acutely worsening mental status, the decision was made to intubate him for airway protection  Successfully intubated with 1 attempt using a MAC 3 glidescope and 7 5 ETT  Etomidate and succinylcholine were used for induction  Patient's airway was deep and significantly anterior  We had great difficulty passing an OG tube, so after 3 attempts, we aborted placement  Propofol was used for post intubation sedation  Patient taken to CT scanner immediately for reevaluation of his intracranial bleeding

## 2023-05-28 NOTE — PROCEDURES
Intubation    Date/Time: 5/28/2023 2:40 AM    Performed by: Merissa Steiner MD  Authorized by: Merissa Steiner MD    Patient location:  ED  Other Assisting Provider: Yes (comment) (Dr Ernie Funez)    Consent:     Consent obtained:  Emergent situation  Universal protocol:     Patient identity confirmed:  Arm band  Pre-procedure details:     Patient status:  Unresponsive    Pretreatment medications:  Etomidate    Paralytics:  Succinylcholine  Indications:     Indications for intubation: airway protection    Procedure details:     Preoxygenation:  Nasal cannula    CPR in progress: no      Intubation method:  Oral    Oral intubation technique:  Glidescope    Laryngoscope blade: Mac 3    Tube size (mm):  7 5    Tube type:  Cuffed    Number of attempts:  1    Tube visualized through cords: yes    Placement assessment:     ETT to lip:  22    Tube secured with:  ETT boogie    Breath sounds:  Equal and absent over the epigastrium    Placement verification: chest rise, condensation, CXR verification, direct visualization, equal breath sounds, ETCO2 detector and tube exhalation      CXR findings:  ETT in proper place  Post-procedure details:     Patient tolerance of procedure:   Tolerated well, no immediate complications

## 2023-05-28 NOTE — DEATH NOTE
INPATIENT DEATH NOTE  Raymond Silva 78 y o  male MRN: [id number]  Unit/Bed#: ICU 12 Encounter: [id number]    Date, Time and Cause of Death    Date of Death: 23  Time of Death: 10:18 AM  Preliminary Cause of Death: Intracranial hemorrhage ([facility] [address] )  Entered by: Krupa URBAN[PG1 1]     Attribution     PG1 1 Krupa Cartagena, [facility]  23 10:38           Patient's Information  Date of Death: 23  Time of Death: 80  Pronounced by: Dr Caceres Loss  Did the patient's death occur in the ED?: No  Did the patient's death occur in the OR?: No  Did the patient's death occur less than 10 days post-op?: No  Did the patient's death occur within 24 hours of admission?: Yes  Was code status DNR at the time of death?: Yes    PHYSICAL EXAM:  Unresponsive to noxious stimuli, Spontaneous respirations absent, Carotid pulse absent, Pupillary light reflex absent and Corneal blink reflex absent    Medical Examiner notification criteria:  Patient  within 24 hours of arrival to hospital   Medical Examiner's office notified?:  Yes   Medical Examiner accepted case?: Pending  Name of Medical Examiner: Pending    Family Notification  Was the family notified?: No (Comment) (Unable to locate family)    Autopsy Options:  Awaiting call back from medical examiner    Primary Service Attending Physician notified?:  yes - Attending:  Misha Delarosa, DO    Physician/Resident responsible for completing Discharge Summary:  RAJNI Middleton

## 2023-05-28 NOTE — QUICK NOTE
Once results of TEG were received, the patient was administered DDAVP and Kcentra for reversal of coagulopathy  After initial CT imaging patient noted to have rapid deterioration in his mental status exam  He was intubated for airway protection in the emergency department and underwent a STAT CTA which showed interval development of significant increase in bilateral subarachnoid hemorrhages and hemorrhagic contusions including the midbrain and brainstem  Bilateral subdural hematomas  Right to left midline shift of 5 mm  No evidence of large vessel occlusion or significant intracranial aneurysm  Patient brought to the ICU from the scanner  Neurosurgery was consulted and case discussed with AP  Patient had further decompensation with fixed dilation of his pupils and loss of brainstem reflexes       Lei Potter MD  General Surgery   05/28/23

## 2023-05-28 NOTE — H&P
H&P - Trauma   Florecita Luna 78 y o  male MRN: [id number]  Unit/Bed#: ED 17 Encounter: [id number]    Trauma Alert: Level A   Model of Arrival: Ambulance    Trauma Team: Attending Titus Seip, Residents Katrin and Fellow Yeison  Consultants:     Neurosurgery: routine consult; Epic consult order placed; Assessment/Plan   Active Problems / Assessment:   - Bilateral subdural hematomas  - Bilateral subarachnoid hemorrhages   - Altered mental status     Plan:   - Admit to trauma ICU   - Cervical collar removed with negative CT imaging, spontaneous movement of all extremities, and for concern of decreasing cerebral perfusion  - Intubated for decreased mental status and unresponsive  - Follow up TEG to correct any coagulopathies  - Care management to help with contacting family    History of Present Illness     Chief Complaint: fall and AMS  Mechanism:Fall     HPI:    Florecita Luna is a 78 y o  male who presents with altered mental status after a fall  Patient [place] entrance  He was seen standing out of his wheelchair, clutching his chest, then fell backwards onto the ground  On arrival to the trauma bay, patient does not speak any words  Based on items with him, he likely speak a [place] dialect  However, he does not respond to a Mandarin or Cantonese   Review of Systems   Unable to perform ROS: Mental status change     12-point, complete review of systems was reviewed and negative except as stated above  Historical Information     PMH: unknown  PSH: unknown    Unable to obtain history due to Acute encephalopathy       There is no immunization history for the selected administration types on file for this patient  Last Tetanus: today  Family History: Non-contributory    1  Before the illness or injury that brought you to the Emergency, did you need someone to help you on a regular basis? unknown   2   Since the illness or injury that brought you to the Emergency, have you needed more help than usual to take care of yourself? unknown   3  Have you been hospitalized for one or more nights during the past 6 months (excluding a stay in the Emergency Department)? unknown   4  In general, do you see well? unknown   5  In general, do you have serious problems with your memory? unknown   6  Do you take more than three different medications everyday? unknown   TOTAL   unknown     Did you order a geriatric consult if the score was 2 or greater?: yes     Meds/Allergies   Unknown med  Allergies have not been reviewed; Not on File    Objective   Initial Vitals:   Pulse: 90 (05/28/23 0106)  Respirations: 20 (05/28/23 0106)  Blood Pressure: (!) 140/103 (05/28/23 0106)    Primary Survey:   Airway:        Status: patent;        Pre-hospital Interventions: none        Hospital Interventions: none  Breathing:        Pre-hospital Interventions: none       Effort: normal       Right breath sounds: normal       Left breath sounds: normal  Circulation:        Rhythm: regular       Rate: regular   Right Pulses Left Pulses    R radial: 2+    R pedal: 2+     L radial: 2+    L pedal: 2+       Disability: Interventions: Unable to assess dorsi and plantarflexion with AMS       GCS: Eye: 4; Verbal: 1 Motor: 5 Total: 10       Right Pupil: 4 mm;  round;  reactive         Left Pupil:  4 mm;  round;  reactive      R Motor Strength L Motor Strength    R : 5/5   L : 5/5          Sensory deficit: Unable to assess  Exposure:       Completed: Yes      Secondary Survey:  Physical Exam  Vitals and nursing note reviewed  Constitutional:       Appearance: He is ill-appearing  Comments: Occasional episodes of vomiting   HENT:      Head: Normocephalic  Comments: Posterior scalp hematoma     Right Ear: External ear normal       Left Ear: External ear normal       Nose: Nose normal       Mouth/Throat:      Mouth: Mucous membranes are moist       Pharynx: Oropharynx is clear     Eyes:      Extraocular Movements: Extraocular movements intact  Pupils: Pupils are equal, round, and reactive to light  Neck:      Comments: No cervical step offs  Cardiovascular:      Rate and Rhythm: Normal rate and regular rhythm  Pulses: Normal pulses  Pulmonary:      Effort: Pulmonary effort is normal  No respiratory distress  Breath sounds: Normal breath sounds  Abdominal:      General: Abdomen is flat  There is no distension  Palpations: Abdomen is soft  Tenderness: There is no abdominal tenderness  Musculoskeletal:         General: Normal range of motion  Right lower leg: No edema  Left lower leg: No edema  Skin:     General: Skin is warm and dry  Neurological:      GCS: GCS eye subscore is 4  GCS verbal subscore is 1  GCS motor subscore is 5  Comments: Patient does not cooperate with neuro exam possibly secondary to language barrier vs AMS  He is spontaneously moving his upper and lower extremities  He is seen strongly gripping onto money with both hands  He does not speak even with painful stimuli  Stroking of the underfeet causes flexion at the toes  No clonus  Invasive Devices     Peripheral Intravenous Line  Duration           Peripheral IV 05/28/23 Left Antecubital <1 day    Peripheral IV 05/28/23 Right Antecubital <1 day              Lab Results:   Results: I have personally reviewed all pertinent laboratory/tests results, BMP/CMP:   Lab Results   Component Value Date    CO2 35 (H) 05/28/2023    GLUCOSE 153 (H) 05/28/2023    and CBC:   Lab Results   Component Value Date    HCT 40 8 05/28/2023    HCT 40 05/28/2023    HGB 13 0 05/28/2023    HGB 13 6 05/28/2023    MCH 31 9 05/28/2023    MCHC 31 9 05/28/2023     (H) 05/28/2023    MPV 9 1 05/28/2023    NRBC 0 05/28/2023     (L) 05/28/2023    RBC 4 07 05/28/2023    RDW 12 0 05/28/2023    WBC 8 20 05/28/2023       Imaging Results: I have personally reviewed pertinent reports      Chest Xray(s): negative for acute findings   FAST exam(s): negative for acute findings   CT Scan(s): positive for acute findings: bilateral subdural and subarachnoid hemorrhage   Additional Xray(s): N/A     Other Studies: none    Code Status: Level 1 - Full Code  Advance Directive and Living Will:      Power of :    POLST:    I have spent 40 minutes with Patient  today in which greater than 50% of this time was spent in counseling/coordination of care regarding Diagnostic results, Prognosis, Risks and benefits of tx options, Instructions for management, Impressions, Counseling / Coordination of care, Documenting in the medical record, Reviewing / ordering tests, medicine, procedures  , Obtaining or reviewing history   and Communicating with other healthcare professionals

## 2023-05-30 NOTE — UTILIZATION REVIEW
Initial Clinical Review    Admission: Date/Time/Statement:   Admission Orders (From admission, onward)     Ordered        05/28/23 0141  Inpatient Admission  Once                      Orders Placed This Encounter   Procedures   • Inpatient Admission     Standing Status:   Standing     Number of Occurrences:   1     Order Specific Question:   Level of Care     Answer:   Critical Care [15]     Order Specific Question:   Estimated length of stay     Answer:   More than 2 Midnights     Order Specific Question:   Certification     Answer:   I certify that inpatient services are medically necessary for this patient for a duration of greater than two midnights  See H&P and MD Progress Notes for additional information about the patient's course of treatment  ED Arrival Information     Expected   -    Arrival   5/28/2023 01:04    Acuity   Immediate            Means of arrival   Ambulance    Escorted by   [facility]    Service   Trauma    Admission type   [address] complaint   Trauma           Chief Complaint   Patient presents with   • Trauma       Initial Presentation: 78 y o  male who presented by EMS to [address] [place] [address] ED  Inpatient admission for evaluation and treatment of SAH/SDH  Presented w/ AMS after a fall  On exam, GCS 10, ill-appearing, posterior scalp hematoma, spontaneous movement of extremities  CT showed b/l subdural and subarachnoid hemorrhage  Just prior to transport to ICU, less responsive, not opening eyes, now flexing to pain in upper and lower extremities  Plan: intubated for decreased mental status and airway protection, follow TEG, given Kcentra and DDAVP, NPO, concern for loss of brainstem function, keppra, SBP < 180, camejo, I&O, Trend labs, replete electrolytes as needed  Neurosurgery consulted  Neurosurgery: Patient is intubated and on Fentanyl, Pupils fully dilated and nonreactive to light, no brainstem reflexes detected   Not a surgical candidate  Code Blue: Pulseless VF @ [id number]  CPR initiated  Epi administered x 2  Synchronized cardioversion for wide complex pulseless rhythm  ROSC intermittently achieved  Bedside TTE with initial LV/RV function, rapidly deteriorated to poor functionality  Extensive chest wall damage occurred during high quality chest compressions  Given significance and non-survivable injury given TBI with herniation, 2 physicians concur that continued resuscitative supports are harmful and no longer provided beneficial care given extent of brain injury  Palliative Care: Pt family unable to be contacted as no emergecy contact in chart, attempted to find contacts in phone without response  TBI w/ herniation, transition to comfort care        Date of Death: 05/28/23  Time of Death: 1018    ED Triage Vitals   Temperature Pulse Respirations Blood Pressure SpO2   05/28/23 0154 05/28/23 0106 05/28/23 0106 05/28/23 0106 05/28/23 0106   (!) 96 7 °F (35 9 °C) 90 20 (!) 140/103 97 %      Temp Source Heart Rate Source Patient Position - Orthostatic VS BP Location FiO2 (%)   05/28/23 0154 05/28/23 0106 05/28/23 0130 05/28/23 0400 --   Rectal Monitor Lying Left arm       Pain Score       --                 Wt Readings from Last 1 Encounters:   05/28/23 50 9 kg (112 lb 3 4 oz)     Additional Vital Signs:   Date/Time Temp Pulse Resp BP MAP (mmHg) SpO2 O2 Device   05/28/23 1018 97 5 °F (36 4 °C) -- 16 -- -- -- --   05/28/23 1017 -- 28 Abnormal  -- -- -- -- --   05/28/23 1015 97 5 °F (36 4 °C) 38 Abnormal  15 -- -- -- --   05/28/23 1012 97 5 °F (36 4 °C) 84 9 Abnormal  -- -- -- --   05/28/23 1010 97 5 °F (36 4 °C) 122 Abnormal  12 100/65 82 -- --   05/28/23 1009 97 5 °F (36 4 °C) 130 Abnormal  10 Abnormal  -- -- -- --   05/28/23 1006 97 5 °F (36 4 °C) 132 Abnormal  24 Abnormal  -- -- 100 % --   05/28/23 10:05:02 -- -- -- 143/110 Abnormal  -- -- --   05/28/23 1005 -- -- -- -- -- 99 % --   05/28/23 1003 97 9 °F (36 6 °C) 86 12 -- -- -- --   05/28/23 1000 97 5 °F (36 4 °C) 134 Abnormal  10 Abnormal  159/138 Abnormal  147 -- --   05/28/23 0958 -- 138 Abnormal  -- -- -- -- --   05/28/23 0957 97 9 °F (36 6 °C) -- 17 -- -- -- --   05/28/23 0955 -- -- -- -- -- 44 % Abnormal  --   05/28/23 0900 98 2 °F (36 8 °C) 72 16 209/113 Abnormal  176 99 % --   05/28/23 0800 98 2 °F (36 8 °C) 58 19 158/87 143 -- --   05/28/23 0729 98 2 °F (36 8 °C) 42 Abnormal  21 174/85 Abnormal  109 100 % --   05/28/23 0700 97 9 °F (36 6 °C) 42 Abnormal  21 181/78 Abnormal  98 100 % --   05/28/23 0600 97 5 °F (36 4 °C) 52 Abnormal  16 127/64 90 100 % --   05/28/23 0500 96 8 °F (36 °C) Abnormal  50 Abnormal  25 Abnormal  119/58 102 100 % --   05/28/23 0400 96 4 °F (35 8 °C) Abnormal  70 28 Abnormal  116/58 89 100 % Ventilator   05/28/23 0315 96 4 °F (35 8 °C) Abnormal  70 22 162/86 119 100 % --   05/28/23 0300 -- -- -- -- -- 100 % --   05/28/23 0245 -- 68 20 222/102 Abnormal  -- 100 % Ventilator   05/28/23 0240 -- -- -- -- -- 99 % --   05/28/23 0230 -- 73 20 210/100 Abnormal  -- 100 % Ventilator   05/28/23 0215 -- 62 18 234/115 Abnormal  -- 97 % --   05/28/23 0200 -- -- 18 211/102 Abnormal  -- 94 % None (Room air)   05/28/23 0154 96 7 °F (35 9 °C) Abnormal  -- -- -- -- -- --   05/28/23 0145 -- 69 18 205/93 Abnormal  -- 95 % None (Room air)   05/28/23 0130 -- 87 20 183/110 Abnormal  -- -- None (Room air)   05/28/23 01:15:08 -- 90 20 161/119 Abnormal  -- 95 % None (Room air)     Katelynn Coma Scale  Date and Time Eye Opening Best Verbal Response Best Motor Response Hudson Coma Scale Score   05/28/23 1000 1 1 1 3   05/28/23 0900 1 1 1 3   05/28/23 0800 1 1 1 3   05/28/23 0700 1 1 1 3   05/28/23 0600 1 1 1 3   05/28/23 0500 1 1 1 3   05/28/23 0400 1 1 1 3   05/28/23 0315 1 1 1 3   05/28/23 0245 4 1 1 6   05/28/23 0230 4 1 1 6   05/28/23 0215 4 1 1 6   05/28/23 0200 4 1 1 6   05/28/23 0145 4 1 1 6   05/28/23 0130 4 1 5 10   05/28/23 0115 4 1 5 10   05/28/23 0106 4 1 5 10 Pertinent Labs/Diagnostic Test Results:   5/28 - EKG  Atrial fibrillation with premature ventricular or aberrantly conducted complexes  Right bundle branch block    XR chest 1 view portable   Final Result by Shayna Bocanegra MD (05/28 1314)   Tubes and lines as above without pneumothorax  No acute cardiopulmonary disease  Workstation performed: [id number]         CTA head and neck w wo contrast   Final Result by Mat House DO (05/28 9178)      Interval development of significant increase in bilateral subarachnoid hemorrhages and hemorrhagic contusions including the midbrain and brainstem  Bilateral subdural hematomas as described above  Right to left midline shift of 5 mm  No evidence of large vessel occlusion or significant intracranial aneurysm            I personally discussed this study with TAYLER KING on 5/28/2023 3:08 AM                   Workstation performed: [id number]         XR chest portable   Final Result by Jameson Campbell MD (05/28 1348)      1  Nasogastric tube has been repositioned and the tip overlies the upper esophagus  This was subsequently advanced  Workstation performed: [id number]         XR chest 1 view portable   Final Result by Jameson Campbell MD (05/28 1343)      ET tube in satisfactory position  The nasogastric tube is seen extending to the right lower lobe  This was subsequently repositioned  No active pulmonary disease  Workstation performed: [id number]         TRAUMA - CT head wo contrast   Final Result by Mat House DO (05/28 8433)      Bilateral subdural hematomas and scattered subarachnoid hemorrhages as described above  I personally discussed this study with Segun Khan on 5/28/2023 1:45 AM                   Workstation performed: [id number]         TRAUMA - CT spine cervical wo contrast   Final Result by Mat House DO (05/28 4873)      No cervical spine fracture or traumatic malalignment  Workstation performed: [id number]         XR Trauma multiple (SLB/SLRA trauma bay ONLY)   Final Result by Sheela Faust DO (05/28 0440)      Increased density within the right lower lobe which may represent developing infiltrate versus atelectasis                 Workstation performed: [id number]               Results from last 7 days   Lab Units 05/28/23  0117   HEMATOCRIT % 40 8   HEMATOCRIT, ISTAT % 40   HEMOGLOBIN g/dL 13 0   I STAT HEMOGLOBIN g/dl 13 6   NEUTROS ABS Thousands/µL 3 52   PLATELETS Thousands/uL 145*   WBC Thousand/uL 8 20         Results from last 7 days   Lab Units 05/28/23  0553 05/28/23  0117   ANION GAP mmol/L 2* 4   BUN mg/dL 16 16   CALCIUM, IONIZED, ISTAT mmol/L  --  1 23   CALCIUM mg/dL 7 8* 9 3   CHLORIDE mmol/L 115* 105   CO2 mmol/L 27 28   CO2, I-STAT mmol/L  --  35*   CREATININE mg/dL 1 15 1 25   EGFR ml/min/1 73sq m 61 55   POTASSIUM mmol/L 4 4 3 2*   MAGNESIUM mg/dL  --  2 1   PHOSPHORUS mg/dL  --  3 7   SODIUM mmol/L 144 137     Results from last 7 days   Lab Units 05/28/23  0117   ALBUMIN g/dL 3 9   ALK PHOS U/L 54   ALT U/L 42   AST U/L 60*   TOTAL BILIRUBIN mg/dL 1 33*   TOTAL PROTEIN g/dL 7 0         Results from last 7 days   Lab Units 05/28/23  0553 05/28/23  0117   GLUCOSE RANDOM mg/dL 166* 157*     Results from last 7 days   Lab Units 05/28/23  0553   OSMOLALITY, SERUM mmol/*     Results from last 7 days   Lab Units 05/28/23  0623   BASE EXC ART mmol/L -1 8   HCO3 ART mmol/L 22 8   O2 CONTENT ART mL/dL 17 2   O2 HGB, ARTERIAL % 98 8*   PCO2 ART mm Hg 38 5   PH ART  7 391   PO2 ART mm Hg 442 7*   ABG SOURCE  Radial, Right         Results from last 7 days   Lab Units 05/28/23  0117   HCO3, GINNY ISTAT mmol/L 32 7*   I STAT BASE EXC mmol/L 5*   PCO2, GINNY ISTAT mm HG 63 7*   PH, GINNY I-STAT  7 319   PO2, GINNY ISTAT mm HG 24 0*   I STAT O2 SAT % 37*         Results from last 7 days   Lab Units 05/28/23  0544 05/28/23  0333 05/28/23  0117   HS TNI 0HR ng/L  --   --  7   HS TNI 2HR ng/L  --  8  --    HS TNI 4HR ng/L 12  --   --    HSTNI D2 ng/L  --  1  --    HSTNI D4 ng/L 5  --   --          Results from last 7 days   Lab Units 05/28/23  0144   INR  2 17*   PROTIME seconds 24 4*   PTT seconds 89*     Results from last 7 days   Lab Units 05/28/23  0553   OSMOLALITY, SERUM mmol/*                 Results from last 7 days   Lab Units 05/28/23  0333   AMPH/METH  Negative   BARBITURATE UR  Negative   BENZODIAZEPINE UR  Negative   COCAINE UR  Negative   METHADONE URINE  Negative   OPIATE UR  Negative   PCP UR  Negative   THC UR  Negative     Results from last 7 days   Lab Units 05/28/23 0144 05/28/23  0117   ACETAMINOPHEN LVL ug/mL <2*  --    ETHANOL LVL mg/dL <3 <3   SALICYLATE LVL mg/dL <3*  --          ED Treatment:   Medication Administration from 05/28/2023 0055 to 05/28/2023 5416       Date/Time Order Dose Route Action     05/28/2023 0115 EDT ondansetron (ZOFRAN) injection 4 mg 4 mg Intravenous Given     05/28/2023 0152 EDT sodium chloride 0 9 % infusion 100 mL/hr Intravenous New Bag     05/28/2023 0147 EDT labetalol (NORMODYNE) injection 20 mg 20 mg Intravenous Given     05/28/2023 0150 EDT tetanus-diphtheria-acellular pertussis (BOOSTRIX) IM injection 0 5 mL 0 5 mL Intramuscular Given     05/28/2023 0233 EDT HYDROmorphone (DILAUDID) injection 0 5 mg 0 5 mg Intravenous Given     05/28/2023 0215 EDT etomidate (AMIDATE) 2 mg/mL injection 10 mg Intravenous Given     05/28/2023 0215 EDT Succinylcholine Chloride 100 mg/5 mL syringe 80 mg Intravenous Given     05/28/2023 0220 EDT propofol (DIPRIVAN) 200 MG/20ML bolus injection 20 mg Intravenous Given     05/28/2023 0307 EDT prothrombin complex concentrate (human) (Kcentra) 2,000 Units 2,000 Units Intravenous Given     05/28/2023 0256 EDT iohexol (OMNIPAQUE) 350 MG/ML injection (MULTI-DOSE) 85 mL 85 mL Intravenous Given        No past medical history on file    Present on Admission:  • Subdural hemorrhage (HCC)  • Subarachnoid hemorrhage [facility])  • Acute respiratory failure (HCC)  • Abrasion of scalp  • Fall      Admitting Diagnosis: Trauma [T14 90XA]  SAH (subarachnoid hemorrhage) (HCC) [I60 9]  SDH (subdural hematoma) (HCC) [S06  5XAA]  Unspecified multiple injuries, initial encounter [T07  XXXA]  Age/Sex: 78 y o  male  Admission Orders:  NPO  DW  I&O   q1h neuro checks  Continuous Cardio-Pulm monitoring  Scheduled Medications:  Medications 05/28/23   chlorhexidine (PERIDEX) 0 12 % oral rinse 15 mL  Dose: 15 mL  Freq: Every 12 hours scheduled Route: MT  Start: 05/28/23 0245 End: 05/28/23 1457   Admin Instructions:   Swish orally for 30 seconds, then expectorate   Do not swallow  **DISPOSE IN 8 GALLON BLACK CONTAINER**    0320   0805   1457-D/C'd             desmopressin (DDAVP) 23 2 mcg in sodium chloride 0 9 % 50 mL IVPB  Dose: 0 4 mcg/kg  Weight Dosing Info: 58 3 kg  Freq: Once Route: IV  Last Dose: Stopped (05/28/23 0400)  Start: 05/28/23 0245 End: 05/28/23 0400   Admin Instructions:   Refrigerate     0322   0400                fentanyl citrate (PF) 100 MCG/2ML 50 mcg  Dose: 50 mcg  Freq: Once Route: IV  Start: 05/28/23 1015 End: 05/28/23 1015   Admin Instructions:   High Alert Medication  LOOK ALIKE SOUND ALIKE MED    1015                 HYDROmorphone (DILAUDID) injection 0 5 mg  Dose: 0 5 mg  Freq: Once Route: IV  Start: 05/28/23 0245 End: 05/28/23 0315   Admin Instructions:   High alert medication  LOOK ALIKE SOUND ALIKE MED    0315                 levETIRAcetam (KEPPRA) 1,500 mg in sodium chloride 0 9 % 100 mL IVPB  Dose: 1,500 mg  Freq: Once Route: IV  Last Dose: Stopped (05/28/23 0600)  Start: 05/28/23 0215 End: 05/28/23 0600   Admin Instructions:   LOOK ALIKE SOUND ALIKE MED    0411   0600                phytonadione (AQUA-MEPHYTON) 10 mg/mL 10 mg in sodium chloride 0 9 % 50 mL IVPB  Dose: 10 mg  Freq: Once Route: IV  Last Dose: Stopped (05/28/23 0400)  Start: 05/28/23 0245 End: 05/28/23 0400   Admin Instructions:   Protect from light  LOOK ALIKE SOUND ALIKE MED    0327   0400                sodium chloride (HYPERTONIC) 3 % bolus 250 mL  Dose: 250 mL  Freq: Once Route: IV  Start: 05/28/23 0230 End: 05/28/23 0320   Admin Instructions:   High Alert Medication  Central line administration recommended  0320           Continuous IV Infusions:  Medications 05/28/23   fentaNYL 1000 mcg in sodium chloride 0 9% 100mL infusion  Rate: 5 mL/hr Dose: 50 mcg/hr  Freq: Continuous Route: IV  Last Dose: Stopped (05/28/23 0847)  Start: 05/28/23 0245 End: 05/28/23 0849   Admin Instructions:   Caution: Look-alike/sound-alike drug name! High-alert medication! 0316   0847   0849-D/C'd             propofol (DIPRIVAN) 1000 mg in 100 mL infusion (premix)  Rate: 1 7-17 5 mL/hr Dose: 5-50 mcg/kg/min  Weight Dosing Info: 58 3 kg  Freq: Titrated Route: IV  Last Dose: Stopped (05/28/23 0340)  Start: 05/28/23 0245 End: 05/28/23 0849   Admin Instructions:   Initial dose 30 mcg/kg/min  Titrate by 5 mcg/kg/min every 5 minutes to achieve sedation goal as ordered  High-alert medication! Shake well prior to use  Discard tubing and unused portion of vial after 12 hours  Order specific questions:   Target RASS 0: Alert and calm    0319   0340   0849-D/C'd             sodium chloride (HYPERTONIC) 3 % infusion  Rate: 30 mL/hr Dose: 30 mL/hr  Freq: Continuous Route: IV  Last Dose: Stopped (05/28/23 0852)  Start: 05/28/23 0345 End: 05/28/23 0849   Admin Instructions:   High Alert Medication  Central line administration only      0352   0443 [C]   0544   0849-D/C'd  0852              sodium chloride 0 9 % infusion  Rate: 100 mL/hr Dose: 100 mL/hr  Freq: Continuous Route: IV  Last Dose: Stopped (05/28/23 1001)  Start: 05/28/23 0145 End: 05/28/23 1457    0152   0441   1001   1457-D/C'd         PRN Meds: none      IP CONSULT TO NEUROSURGERY  IP CONSULT TO PALLIATIVE CARE    Network Utilization Review Department  ATTENTION: Please call with any questions or concerns to [phone] and carefully listen to the prompts so that you are directed to the right person  All voicemails are confidential   Marmartin Dies all requests for admission clinical reviews, approved or denied determinations and any other requests to dedicated fax number below belonging to the campus where the patient is receiving treatment   List of dedicated fax numbers for the Facilities:  [address] DENIALS (Administrative/Medical Necessity) [phone]   [address] (Maternity/NICU/Pediatrics) [phone]    Dilia Leon [phone]   [facility]  [phone]   [address] [place] [address] Nicholas Bolanos [facility] 28 [phone]   1553 [facility] 134 815 [facility] [phone]

## 2023-05-31 NOTE — UTILIZATION REVIEW
NOTIFICATION OF INPATIENT ADMISSION   AUTHORIZATION REQUEST   SERVICING FACILITY:   [facility]  Address: [address] [place] [address]  Tax ID: [id number]  NPI: [id number] ATTENDING PROVIDER:  Attending Name and NPI#: Winnie Davidson [[id number]]  Address: [address] [place] [address]  Phone: [phone]   ADMISSION INFORMATION:  Place of Service: [place] [address]  Place of Service Code: 21  Inpatient Admission Date/Time: 5/28/23  1:41 AM  Discharge Date/Time: 5/28/2023 12:57 PM  Admitting Diagnosis Code/Description:  Trauma [T14 90XA]  SAH (subarachnoid hemorrhage) ([facility]) [I60 9]  [facility] ) [S06  5XAA]  Unspecified multiple injuries, initial encounter [T07  XXXA]     UTILIZATION REVIEW CONTACT:  Jalen Doyle Utilization   Network Utilization Review Department  Phone: [phone]  Fax: [phone]  Email: [address] [place] [address]  [email or web address]  Contact for approvals/pending authorizations, clinical reviews, and discharge  PHYSICIAN ADVISORY SERVICES:  Medical Necessity Denial & Gqbt-lw-Ldlh Review  Phone: [phone]  Fax: [phone]  Email: [email or web address]